# Patient Record
Sex: FEMALE | Race: OTHER | URBAN - METROPOLITAN AREA
[De-identification: names, ages, dates, MRNs, and addresses within clinical notes are randomized per-mention and may not be internally consistent; named-entity substitution may affect disease eponyms.]

---

## 2019-05-02 ENCOUNTER — IMPORTED ENCOUNTER (OUTPATIENT)
Dept: URBAN - METROPOLITAN AREA CLINIC 50 | Facility: CLINIC | Age: 74
End: 2019-05-02

## 2019-05-02 NOTE — PATIENT DISCUSSION
"""Continue Restasis/2/OU [FreeTextEntry1] : This is a 34 year old female with PMHx of seasonal and environmental allergies, migraine headaches, anxiety and + PPD who is in for evaluation of her week long bothersome cough. She is currently in the midst of asthmatic type bronchitis- no longer infectious but with airway hyperresponsiveness. She also has contributing factors related to sinus and LPR which will need to be treated.  The plan for the patient is as follows:\par \par #1.Asthmatic type bronchitis\par  -Start Prednisone taper of 30 mg x 3 days, 20 mg x 5 days and 10 mg x 5 days- educational/instructional sheet provided that discusses side effects and directions given to patient.    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 Salvador. \par pt is encouraged to call or fax the office with any questions or concerns. \par Explained to the pt in full detail how to use the inhalers and inhaler hygiene. Education provided to the PT regarding their visit and conditions. Advised patient to call office in next week with update.  She was agreeable.\par

## 2021-04-18 ASSESSMENT — TONOMETRY
OD_IOP_MMHG: 16
OS_IOP_MMHG: 17

## 2021-04-18 ASSESSMENT — VISUAL ACUITY
OS_SC: 20/25
OD_CC: J116IN
OD_SC: 20/30-1
OD_BAT: 20/30
OD_OTHER: 20/30. 20/60.
OD_PH: 20/25-2
OS_BAT: 20/40
OS_CC: J116IN